# Patient Record
Sex: MALE | Race: OTHER | Employment: OTHER | URBAN - METROPOLITAN AREA
[De-identification: names, ages, dates, MRNs, and addresses within clinical notes are randomized per-mention and may not be internally consistent; named-entity substitution may affect disease eponyms.]

---

## 2018-12-19 ENCOUNTER — OFFICE VISIT (OUTPATIENT)
Dept: CARDIOLOGY CLINIC | Age: 53
End: 2018-12-19

## 2018-12-19 VITALS
HEART RATE: 62 BPM | WEIGHT: 165 LBS | SYSTOLIC BLOOD PRESSURE: 113 MMHG | BODY MASS INDEX: 25.01 KG/M2 | DIASTOLIC BLOOD PRESSURE: 72 MMHG | HEIGHT: 68 IN

## 2018-12-19 DIAGNOSIS — Z13.6 SCREENING FOR ISCHEMIC HEART DISEASE: ICD-10-CM

## 2018-12-19 DIAGNOSIS — Z82.49 FAMILY HISTORY OF CORONARY ARTERY DISEASE: ICD-10-CM

## 2018-12-19 DIAGNOSIS — R94.31 ABNORMAL EKG: Primary | ICD-10-CM

## 2018-12-19 NOTE — PROGRESS NOTES
HISTORY OF PRESENT ILLNESS  Cam American is a 46 y.o. male. Patient is here for cardiac evaluation. He has strong family history of premature coronary disease and family. Currently denies any chest pain, shortness of breath, or other symptoms. He is fairly active. Review of Systems   Constitutional: Negative for chills and fever. HENT: Negative for nosebleeds. Eyes: Negative for blurred vision and double vision. Respiratory: Negative for cough, hemoptysis, sputum production, shortness of breath and wheezing. Cardiovascular: Negative for chest pain, palpitations, orthopnea, claudication, leg swelling and PND. Gastrointestinal: Negative for abdominal pain, heartburn, nausea and vomiting. Musculoskeletal: Negative for myalgias. Skin: Negative for rash. Neurological: Negative for dizziness, weakness and headaches. Endo/Heme/Allergies: Does not bruise/bleed easily. Family History   Problem Relation Age of Onset    Coronary Artery Disease Maternal Uncle         Multiple maternal uncle with CAD at young age       Past Medical History:   Diagnosis Date    History of stomach ulcers 1983    Kidney stone        Past Surgical History:   Procedure Laterality Date    RECONSTR NOSE  1991       Social History     Tobacco Use    Smoking status: Never Smoker    Smokeless tobacco: Never Used   Substance Use Topics    Alcohol use: No       No Known Allergies    Prior to Admission medications    Medication Sig Start Date End Date Taking? Authorizing Provider   oxyCODONE-acetaminophen (PERCOCET) 7.5-325 mg per tablet Take  by mouth every four (4) hours as needed. Provider, Historical   ondansetron hcl (ZOFRAN) 4 mg tablet Take 4 mg by mouth every eight (8) hours as needed. Provider, Historical         Visit Vitals  /72   Pulse 62   Ht 5' 8\" (1.727 m)   Wt 74.8 kg (165 lb)   BMI 25.09 kg/m²       Physical Exam   Constitutional: He is oriented to person, place, and time.  He appears well-developed and well-nourished. HENT:   Head: Normocephalic and atraumatic. Eyes: Conjunctivae are normal.   Neck: Neck supple. No JVD present. No tracheal deviation present. No thyromegaly present. Cardiovascular: Normal rate, regular rhythm and normal heart sounds. Exam reveals no gallop and no friction rub. No murmur heard. Pulmonary/Chest: Breath sounds normal. No respiratory distress. He has no wheezes. He has no rales. He exhibits no tenderness. Abdominal: Soft. There is no tenderness. Musculoskeletal: He exhibits no edema. Neurological: He is alert and oriented to person, place, and time. Skin: Skin is warm and dry. Psychiatric: He has a normal mood and affect. Mr. Jimmie Palma has a reminder for a \"due or due soon\" health maintenance. I have asked that he contact his primary care provider for follow-up on this health maintenance. No flowsheet data found. Assessment         ICD-10-CM ICD-9-CM    1. Abnormal EKG R94.31 794.31 NUCLEAR CARDIAC STRESS TEST      CTA HEART    T inversion in inferior eats. Rule out ischemia asymptomatic   2. Family history of coronary artery disease Z82.49 V17.3 AMB POC EKG ROUTINE W/ 12 LEADS, INTER & REP      NUCLEAR CARDIAC STRESS TEST      CTA HEART    Strong family history. Will evaluate for ischemic heart disease   3. Screening for ischemic heart disease Z13.6 V81.0 NUCLEAR CARDIAC STRESS TEST      CTA HEART   12/2018  Asymptomatic abnormality on EKG. T inversion in inferior leads. Will proceed with CTA to evaluate for calcium score and nuclear stress test to assess for ischemia. Lipid profile done with PCP    There are no discontinued medications. Orders Placed This Encounter    CTA HEART     Standing Status:   Future     Standing Expiration Date:   1/16/2020     Order Specific Question:   Is Patient Allergic to Contrast Dye?      Answer:   No    AMB POC EKG ROUTINE W/ 12 LEADS, INTER & REP     Order Specific Question:   Reason for Exam:     Answer:   family history       Follow-up Disposition:  Return for F/u after tests.

## 2018-12-19 NOTE — LETTER
Kenia Marquez 1965 12/19/2018 Dear Rani Dc MD 
 
I had the pleasure of evaluating  Mr. Rocael Murphy in office today. Below are the relevant portions of my assessment and plan of care. ICD-10-CM ICD-9-CM 1. Abnormal EKG R94.31 794.31 NUCLEAR CARDIAC STRESS TEST  
   CTA HEART T inversion in inferior eats. Rule out ischemia asymptomatic 2. Family history of coronary artery disease Z82.49 V17.3 AMB POC EKG ROUTINE W/ 12 LEADS, INTER & REP  
   NUCLEAR CARDIAC STRESS TEST  
   CTA HEART Strong family history. Will evaluate for ischemic heart disease 3. Screening for ischemic heart disease Z13.6 V81.0 NUCLEAR CARDIAC STRESS TEST  
   CTA HEART Current Outpatient Medications Medication Sig Dispense Refill  oxyCODONE-acetaminophen (PERCOCET) 7.5-325 mg per tablet Take  by mouth every four (4) hours as needed.  ondansetron hcl (ZOFRAN) 4 mg tablet Take 4 mg by mouth every eight (8) hours as needed. Orders Placed This Encounter  CTA HEART Standing Status:   Future Standing Expiration Date:   1/16/2020 Order Specific Question:   Is Patient Allergic to Contrast Dye? Answer:   No  
 AMB POC EKG ROUTINE W/ 12 LEADS, INTER & REP Order Specific Question:   Reason for Exam: Answer:   family history If you have questions, please do not hesitate to call me. I look forward to following Mr. Rocael Murphy along with you. Sincerely, Liz Patel MD

## 2019-01-02 ENCOUNTER — HOSPITAL ENCOUNTER (OUTPATIENT)
Dept: CT IMAGING | Age: 54
Discharge: HOME | End: 2019-01-02
Attending: INTERNAL MEDICINE
Payer: SELF-PAY

## 2019-01-02 DIAGNOSIS — R94.31 ABNORMAL EKG: ICD-10-CM

## 2019-01-02 DIAGNOSIS — Z82.49 FAMILY HISTORY OF CORONARY ARTERY DISEASE: ICD-10-CM

## 2019-01-02 DIAGNOSIS — Z13.6 SCREENING FOR ISCHEMIC HEART DISEASE: ICD-10-CM

## 2019-01-02 PROCEDURE — 75571 CT HRT W/O DYE W/CA TEST: CPT

## 2019-01-03 ENCOUNTER — TELEPHONE (OUTPATIENT)
Dept: CARDIOLOGY CLINIC | Age: 54
End: 2019-01-03

## 2019-01-03 NOTE — TELEPHONE ENCOUNTER
Spoke with patient. Patient no longer has [place]. New insurance is [employer] and will bring new ins card to office.

## 2019-01-03 NOTE — TELEPHONE ENCOUNTER
Patient is scheduled for a Nuclear Stress Test on 1/4/19 and insurance has denied stating not medically necessary. Please advise.

## 2019-01-15 ENCOUNTER — TELEPHONE (OUTPATIENT)
Dept: CARDIAC CATH/INVASIVE PROCEDURES | Age: 54
End: 2019-01-15

## 2019-01-15 NOTE — TELEPHONE ENCOUNTER
Called pt and informed of coronary calcium score of 0.  According to pt, already spoke to PCP and has no further questions or concerns

## 2019-01-30 ENCOUNTER — OFFICE VISIT (OUTPATIENT)
Dept: CARDIOLOGY CLINIC | Age: 54
End: 2019-01-30

## 2019-01-30 VITALS
HEIGHT: 68 IN | HEART RATE: 61 BPM | WEIGHT: 168 LBS | DIASTOLIC BLOOD PRESSURE: 80 MMHG | SYSTOLIC BLOOD PRESSURE: 136 MMHG | BODY MASS INDEX: 25.46 KG/M2

## 2019-01-30 DIAGNOSIS — R94.31 ABNORMAL EKG: ICD-10-CM

## 2019-01-30 DIAGNOSIS — Z82.49 FAMILY HISTORY OF CORONARY ARTERY DISEASE: Primary | ICD-10-CM

## 2019-01-30 NOTE — PROGRESS NOTES
HISTORY OF PRESENT ILLNESS  Yolanda Ackerman is a 46 y.o. male. Patient is here for cardiac evaluation. He has strong family history of premature coronary disease and family. Currently denies any chest pain, shortness of breath, or other symptoms. He is fairly active. Review of Systems   Constitutional: Negative for chills and fever. HENT: Negative for nosebleeds. Eyes: Negative for blurred vision and double vision. Respiratory: Negative for cough, hemoptysis, sputum production, shortness of breath and wheezing. Cardiovascular: Negative for chest pain, palpitations, orthopnea, claudication, leg swelling and PND. Gastrointestinal: Negative for abdominal pain, heartburn, nausea and vomiting. Musculoskeletal: Negative for myalgias. Skin: Negative for rash. Neurological: Negative for dizziness, weakness and headaches. Endo/Heme/Allergies: Does not bruise/bleed easily. Family History   Problem Relation Age of Onset    Coronary Artery Disease Maternal Uncle         Multiple maternal uncle with CAD at young age       Past Medical History:   Diagnosis Date    History of stomach ulcers 1983    Kidney stone        Past Surgical History:   Procedure Laterality Date    RECONSTR NOSE  1991       Social History     Tobacco Use    Smoking status: Never Smoker    Smokeless tobacco: Never Used   Substance Use Topics    Alcohol use: No       No Known Allergies    Prior to Admission medications    Medication Sig Start Date End Date Taking? Authorizing Provider   oxyCODONE-acetaminophen (PERCOCET) 7.5-325 mg per tablet Take  by mouth every four (4) hours as needed. Provider, Historical   ondansetron hcl (ZOFRAN) 4 mg tablet Take 4 mg by mouth every eight (8) hours as needed. Provider, Historical         Visit Vitals  /80   Pulse 61   Ht 5' 8\" (1.727 m)   Wt 76.2 kg (168 lb)   BMI 25.54 kg/m²       Physical Exam   Constitutional: He is oriented to person, place, and time.  He appears well-developed and well-nourished. HENT:   Head: Normocephalic and atraumatic. Eyes: Conjunctivae are normal.   Neck: Neck supple. No JVD present. No tracheal deviation present. No thyromegaly present. Cardiovascular: Normal rate, regular rhythm and normal heart sounds. Exam reveals no gallop and no friction rub. No murmur heard. Pulmonary/Chest: Breath sounds normal. No respiratory distress. He has no wheezes. He has no rales. He exhibits no tenderness. Abdominal: Soft. There is no tenderness. Musculoskeletal: He exhibits no edema. Neurological: He is alert and oriented to person, place, and time. Skin: Skin is warm and dry. Psychiatric: He has a normal mood and affect. Mr. Agustin Lassiter has a reminder for a \"due or due soon\" health maintenance. I have asked that he contact his primary care provider for follow-up on this health maintenance. IMPRESSION 12/2018  Impression:  Coronary calcium score 0.     RADIOLOGY REPORT: 12/2018 CT     Mild bronchiectasis bilateral lower lobes, likely sequela of prior  infection/inflammation. Procedure Conclusion 12/2018    Nuclear Stress Test     Negative myocardial perfusion imaging. Myocardial perfusion imaging supports a low risk stress test.   There is no prior study available for comparison. .         Assessment         ICD-10-CM ICD-9-CM    1. Family history of coronary artery disease Z82.49 V17.3     Calcium score 0. Stress test negative   2. Abnormal EKG R94.31 794.31    12/2018  Asymptomatic abnormality on EKG. T inversion in inferior leads. Will proceed with CTA to evaluate for calcium score and nuclear stress test to assess for ischemia. Lipid profile done with PCP    There are no discontinued medications. No orders of the defined types were placed in this encounter. Follow-up Disposition:  Return if symptoms worsen or fail to improve.

## 2019-01-30 NOTE — PROGRESS NOTES
1. Have you been to the ER, urgent care clinic since your last visit? Hospitalized since your last visit? No    2. Have you seen or consulted any other health care providers outside of the [address] [place] [address] since your last visit? Include any pap smears or colon screening.  Yes Where: PCP Reason for visit: Routine

## 2019-01-30 NOTE — LETTER
Marisabel Homero 1965 1/30/2019 Dear Parish Alexandre MD 
 
I had the pleasure of evaluating  Mr. Agustin Locke in office today. Below are the relevant portions of my assessment and plan of care. ICD-10-CM ICD-9-CM 1. Family history of coronary artery disease Z82.49 V17.3 Calcium score 0. Stress test negative 2. Abnormal EKG R94.31 794.31 Current Outpatient Medications Medication Sig Dispense Refill  oxyCODONE-acetaminophen (PERCOCET) 7.5-325 mg per tablet Take  by mouth every four (4) hours as needed.  ondansetron hcl (ZOFRAN) 4 mg tablet Take 4 mg by mouth every eight (8) hours as needed. No orders of the defined types were placed in this encounter. If you have questions, please do not hesitate to call me. I look forward to following Mr. Agustin Locke along with you. Sincerely, Zafar Chavez MD

## 2019-09-23 PROBLEM — Z13.6 SCREENING FOR ISCHEMIC HEART DISEASE: Status: RESOLVED | Noted: 2018-12-19 | Resolved: 2019-09-23

## 2020-07-06 PROBLEM — K56.609 SMALL BOWEL OBSTRUCTION (HCC): Status: ACTIVE | Noted: 2020-04-18

## 2022-03-19 PROBLEM — Z82.49 FAMILY HISTORY OF CORONARY ARTERY DISEASE: Status: ACTIVE | Noted: 2018-12-19

## 2022-03-19 PROBLEM — R94.31 ABNORMAL EKG: Status: ACTIVE | Noted: 2018-12-19

## 2022-03-20 PROBLEM — K56.609 SMALL BOWEL OBSTRUCTION (HCC): Status: ACTIVE | Noted: 2020-04-18

## 2024-02-27 ENCOUNTER — OFFICE VISIT (OUTPATIENT)
Age: 59
End: 2024-02-27
Payer: COMMERCIAL

## 2024-02-27 VITALS
HEART RATE: 81 BPM | OXYGEN SATURATION: 98 % | SYSTOLIC BLOOD PRESSURE: 128 MMHG | DIASTOLIC BLOOD PRESSURE: 84 MMHG | WEIGHT: 176 LBS

## 2024-02-27 DIAGNOSIS — R94.31 ABNORMAL EKG: Primary | ICD-10-CM

## 2024-02-27 PROCEDURE — 99203 OFFICE O/P NEW LOW 30 MIN: CPT | Performed by: INTERNAL MEDICINE

## 2024-02-27 PROCEDURE — 93000 ELECTROCARDIOGRAM COMPLETE: CPT | Performed by: INTERNAL MEDICINE

## 2024-02-27 NOTE — PROGRESS NOTES
Identified pt with two pt identifiers(name and ). Reviewed record in preparation for visit and have obtained necessary documentation.    Herminio Huerta presents today for   Chief Complaint   Patient presents with    New Patient       Pt c/o Denied.             Herminio Huerta preferred language for health care discussion is english/other.    Personal Protective Equipment:   Personal Protective Equipment was used including: mask-surgical and hands-gloves. Patient was placed on no precaution(s). Patient was not masked.    Precautions:   Patient currently on None  Patient currently roomed with door closed.    Is someone accompanying this pt? wife    Is the patient using any DME equipment during OV? no    Depression Screening:       No data to display                 Learning Assessment:  No question data found.    Abuse Screenin/27/2024    10:00 AM   AMB Abuse Screening   Do you ever feel afraid of your partner? N   Are you in a relationship with someone who physically or mentally threatens you? N   Is it safe for you to go home? Y          Fall Risk      2024    10:55 AM   Fall Risk   2 or more falls in past year? no   Fall with injury in past year? no         Pt currently taking Anticoagulant therapy? no  Pt currently taking Antiplatelet therapy? no    Coordination of Care:  1. Have you been to the ER, urgent care clinic since your last visit? Hospitalized since your last visit? no    2. Have you seen or consulted any other health care providers outside of the [facility] since your last visit? Include any pap smears or colon screening. no      Please see Red banners under Allergies and Med Rec to remove outside inquires. All correct information has been verified with patient and added to chart.     Medication's patient's would liked removed has been marked not taking to be removed per Verbal order and read back per Dixon Sosa MD  
and time.   HENT: Head: Normocephalic and atraumatic. Eyes: Conjunctivae and extraocular motions are normal.   Neck: No JVD present. Carotid bruit is not appreciated.   Cardiovascular: Regular rhythm.   No murmur, gallop or rubs appreciated  Lung: Breath sounds normal. No respiratory distress. No ronchi or rales appreciated  Abdominal: No tenderness. No rebound and no guarding.   Musculoskeletal: There is no lower extremity edema. No cynosis  Lymphadenopathy:  No cervical or supraclavicular adenopathy appriciated.   Neurological: No gross motor deficit noted.  Skin: No visible skin rash noted.   No Ear discharge noted  Psychiatric: Normal mood and affect.     LABS:   @No results found for: \"WBC\", \"WBCLT\", \"HGBPOC\", \"HGB\", \"HGBP\", \"HCTPOC\", \"HCT\", \"PHCT\", \"PLT\", \"MCV\"  No results found for: \"NA\", \"K\", \"CL\", \"CO2\", \"BUN\", \"CREATININE\", \"GLUCOSE\", \"CALCIUM\"         No data to display              No results found for: \"ALT\"  No results found for: \"LABA1C\"  No results found for: \"TSH\", \"TSHREFLEX\", \"TSHFT4\", \"TSHELE\", \"[id number]\", \"TSHHS\"     01/04/2019 10:57 AM, 01/04/2019 12:00 AM (Final)    · Gated SPECT: Left ventricular function post-stress was normal. Calculated ejection fraction is 64%. There is no evidence of transient ischemic dilation (TID).The TID ratio is 0.81.  · Left ventricular perfusion is normal.  · Baseline ECG: Normal sinus rhythm.  · Low risk Duke treadmill score.  · Negative stress electrocardiogram.  · Negative myocardial perfusion imaging. Myocardial perfusion imaging supports a low risk stress test.    Signed by: Juan Mejía MD on 1/4/2019 10:57 AM, Signed by: Unknown Provider Result on 1/4/2019 12:00 AM    IMPRESSION & PLAN:  Herminio Huerta  is 58 y.o. male with    Prehypertension:  /84.  Goal blood pressure less than 120/80 discussed.  Salt restriction, exercise program, weight loss, dietary modification and DASH diet discussed  Keep checking blood pressure at home    Calcium coronary

## 2025-04-10 ENCOUNTER — OFFICE VISIT (OUTPATIENT)
Age: 60
End: 2025-04-10
Payer: COMMERCIAL

## 2025-04-10 VITALS
SYSTOLIC BLOOD PRESSURE: 122 MMHG | BODY MASS INDEX: 27.58 KG/M2 | OXYGEN SATURATION: 96 % | HEIGHT: 68 IN | DIASTOLIC BLOOD PRESSURE: 90 MMHG | HEART RATE: 72 BPM | WEIGHT: 182 LBS

## 2025-04-10 DIAGNOSIS — Z82.49 FAMILY HISTORY OF CORONARY ARTERY DISEASE: ICD-10-CM

## 2025-04-10 DIAGNOSIS — R94.31 ABNORMAL EKG: Primary | ICD-10-CM

## 2025-04-10 PROCEDURE — 93000 ELECTROCARDIOGRAM COMPLETE: CPT | Performed by: INTERNAL MEDICINE

## 2025-04-10 PROCEDURE — 99213 OFFICE O/P EST LOW 20 MIN: CPT | Performed by: INTERNAL MEDICINE

## 2025-04-10 RX ORDER — MAGNESIUM 30 MG
100 TABLET ORAL 2 TIMES DAILY
COMMUNITY

## 2025-04-10 RX ORDER — ZINC GLUCONATE 50 MG
50 TABLET ORAL DAILY
COMMUNITY

## 2025-04-10 RX ORDER — OMEPRAZOLE 20 MG/1
CAPSULE, DELAYED RELEASE ORAL
COMMUNITY
Start: 2025-03-19

## 2025-04-10 RX ORDER — MULTIVITAMIN WITH IRON
1 TABLET ORAL DAILY
COMMUNITY

## 2025-04-10 ASSESSMENT — PATIENT HEALTH QUESTIONNAIRE - PHQ9
SUM OF ALL RESPONSES TO PHQ QUESTIONS 1-9: 0
1. LITTLE INTEREST OR PLEASURE IN DOING THINGS: NOT AT ALL
2. FEELING DOWN, DEPRESSED OR HOPELESS: NOT AT ALL
SUM OF ALL RESPONSES TO PHQ QUESTIONS 1-9: 0

## 2025-04-10 NOTE — PROGRESS NOTES
allowing me to participate in patient care. Please feel free to call me if you have any question or concern.     Dixon Sosa MD  Please note: This document has been produced using voice recognition software. Unrecognized errors in transcription may be present.